# Patient Record
Sex: FEMALE | Race: WHITE | NOT HISPANIC OR LATINO | Employment: OTHER | URBAN - METROPOLITAN AREA
[De-identification: names, ages, dates, MRNs, and addresses within clinical notes are randomized per-mention and may not be internally consistent; named-entity substitution may affect disease eponyms.]

---

## 2022-02-16 PROCEDURE — 88305 TISSUE EXAM BY PATHOLOGIST: CPT | Mod: TC,ORL | Performed by: INTERNAL MEDICINE

## 2022-02-17 ENCOUNTER — LAB REQUISITION (OUTPATIENT)
Dept: LAB | Facility: CLINIC | Age: 72
End: 2022-02-17
Payer: COMMERCIAL

## 2022-02-17 DIAGNOSIS — K52.9 NONINFECTIVE GASTROENTERITIS AND COLITIS, UNSPECIFIED: ICD-10-CM

## 2022-02-17 DIAGNOSIS — K57.30 DIVERTICULOSIS OF LARGE INTESTINE WITHOUT PERFORATION OR ABSCESS WITHOUT BLEEDING: ICD-10-CM

## 2022-02-18 LAB
PATH REPORT.COMMENTS IMP SPEC: NORMAL
PATH REPORT.COMMENTS IMP SPEC: NORMAL
PATH REPORT.FINAL DX SPEC: NORMAL
PATH REPORT.GROSS SPEC: NORMAL
PATH REPORT.MICROSCOPIC SPEC OTHER STN: NORMAL
PATH REPORT.RELEVANT HX SPEC: NORMAL
PHOTO IMAGE: NORMAL

## 2022-02-18 PROCEDURE — 88305 TISSUE EXAM BY PATHOLOGIST: CPT | Mod: 26 | Performed by: PATHOLOGY

## 2023-03-04 ENCOUNTER — APPOINTMENT (OUTPATIENT)
Dept: GENERAL RADIOLOGY | Facility: CLINIC | Age: 73
End: 2023-03-04
Attending: EMERGENCY MEDICINE
Payer: COMMERCIAL

## 2023-03-04 ENCOUNTER — HOSPITAL ENCOUNTER (EMERGENCY)
Facility: CLINIC | Age: 73
Discharge: HOME | End: 2023-03-04
Payer: COMMERCIAL

## 2023-03-04 VITALS
SYSTOLIC BLOOD PRESSURE: 157 MMHG | RESPIRATION RATE: 18 BRPM | DIASTOLIC BLOOD PRESSURE: 82 MMHG | OXYGEN SATURATION: 100 % | TEMPERATURE: 98.4 F | HEART RATE: 76 BPM

## 2023-03-04 DIAGNOSIS — S42.001A CLOSED DISPLACED FRACTURE OF RIGHT CLAVICLE, UNSPECIFIED PART OF CLAVICLE, INITIAL ENCOUNTER: ICD-10-CM
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
## 2023-03-04 PROCEDURE — 250N000013 HC RX MED GY IP 250 OP 250 PS 637

## 2023-03-04 PROCEDURE — 250N000013 HC RX MED GY IP 250 OP 250 PS 637: Performed by: EMERGENCY MEDICINE

## 2023-03-04 PROCEDURE — 73000 X-RAY EXAM OF COLLAR BONE: CPT | Mod: RT

## 2023-03-04 PROCEDURE — 23500 CLTX CLAVICULAR FX W/O MNPJ: CPT | Mod: RT

## 2023-03-04 PROCEDURE — 73060 X-RAY EXAM OF HUMERUS: CPT | Mod: RT

## 2023-03-04 PROCEDURE — 99285 EMERGENCY DEPT VISIT HI MDM: CPT

## 2023-03-04 PROCEDURE — 73030 X-RAY EXAM OF SHOULDER: CPT | Mod: RT

## 2023-03-04 RX ORDER — OXYCODONE HYDROCHLORIDE 5 MG/1
5 TABLET ORAL EVERY 6 HOURS PRN
Qty: 12 TABLET | Refills: 0 | Status: SHIPPED | OUTPATIENT
Start: 2023-03-04

## 2023-03-04 RX ORDER — ACETAMINOPHEN 500 MG
1000 TABLET ORAL ONCE
Status: COMPLETED | OUTPATIENT
Start: 2023-03-04 | End: 2023-03-04

## 2023-03-04 RX ORDER — IBUPROFEN 600 MG/1
600 TABLET, FILM COATED ORAL ONCE
Status: COMPLETED | OUTPATIENT
Start: 2023-03-04 | End: 2023-03-04

## 2023-03-04 RX ORDER — OXYCODONE HYDROCHLORIDE 5 MG/1
5 TABLET ORAL ONCE
Status: COMPLETED | OUTPATIENT
Start: 2023-03-04 | End: 2023-03-04

## 2023-03-04 RX ADMIN — IBUPROFEN 600 MG: 600 TABLET, FILM COATED ORAL at 17:59

## 2023-03-04 RX ADMIN — ACETAMINOPHEN 1000 MG: 500 TABLET ORAL at 17:59

## 2023-03-04 RX ADMIN — OXYCODONE HYDROCHLORIDE 5 MG: 5 TABLET ORAL at 19:19

## 2023-03-04 ASSESSMENT — ACTIVITIES OF DAILY LIVING (ADL): ADLS_ACUITY_SCORE: 35

## 2023-03-04 ASSESSMENT — ENCOUNTER SYMPTOMS
VOMITING: 0
NAUSEA: 0
BACK PAIN: 0
ARTHRALGIAS: 1
HEADACHES: 0
NECK PAIN: 1

## 2023-03-04 NOTE — ED TRIAGE NOTES
Pt presents for evaluation of shoulder injurty following fall in a parking lot. Pt was trying to haresh down her runaway cart and stumbled and fell. She states she did hit her head but denies LOC, blood thinners, or sx of head injury. Bruising and swelling noted to right collar bone.      Triage Assessment     Row Name 03/04/23 3004       Triage Assessment (Adult)    Airway WDL WDL       Respiratory WDL    Respiratory WDL WDL       Skin Circulation/Temperature WDL    Skin Circulation/Temperature WDL WDL       Cardiac WDL    Cardiac WDL WDL       Peripheral/Neurovascular WDL    Peripheral Neurovascular WDL WDL       Cognitive/Neuro/Behavioral WDL    Cognitive/Neuro/Behavioral WDL WDL

## 2023-03-05 NOTE — ED PROVIDER NOTES
Emergency Department Attending Supervision Note  3/4/2023  7:04 PM      I evaluated this patient in conjunction with Lili Blake PA-C      Briefly, the patient presented with shoulder pain after a fall.  No headache no neck pain no loss of consciousness.      On my exam,       Gen: Resting comfortably   Eyes: Clear conjunctiva, no discharge  Ears: External ears normal without swelling or drainage  Mouth: Mucous membranes moist  CV: regular rate  Resp: speaking in full sentences without any resp distress  Skin: warm dry well perfused, tenderness over the right clavicle no significant tenting  Neuro: Alert, no gross motor or sensory deficits,   Psych: pleasant, affect appropriate        Results:  Right clavicle fracture    ED course:    My impression is   Patient presents ED after fall with a right clavicle fracture.  We will go ahead get her placed in a sling.  We will send home with a little bit of narcotic pain medicine as she has having a bit of pain.  She has some extra family here to help out.  Will encourage Ortho follow-up by calling them on Monday.  Patient feels comfortable with discharge plan.  No evidence of neck trauma or head trauma at this time.        Diagnosis    ICD-10-CM    1. Closed displaced fracture of right clavicle, unspecified part of clavicle, initial encounter  S42.001A       2. Fall, initial encounter  W19.XXXA             Lili Blake PA-C Tschetter, Paul Anthony, MD  03/04/23 1959

## 2023-03-05 NOTE — ED PROVIDER NOTES
History     Chief Complaint:  Fall       HPI   Jeri Candelaria is a 72 year old female with a history of chronic right shoulder pain who presents with right shoulder pain following a mechanical fall in a parking lot today. Patient did hit her head after hitting her shoulder first without loss of consciousness. No midline neck pain or issues ranging her neck. Denies headache, nausea, vomiting, vision change, dizziness or recent head injury. She also denies chest pain, back pain, and headaches.  Patient does not take blood thinners other than low dose Asprin. She as gotten tylenol and ibuprofen here.     Independent Historian:   None - Patient Only    Review of External Notes: None    ROS:  Review of Systems   Eyes: Negative for visual disturbance.   Cardiovascular: Negative for chest pain.   Gastrointestinal: Negative for nausea and vomiting.   Musculoskeletal: Positive for arthralgias and neck pain. Negative for back pain.   Neurological: Negative for syncope and headaches.   A comprehensive ROS was obtained and is negative aside from those called out in the HPI above.     Allergies:  No known drug allergies      Medications:    gabapentin  Synthroid  Cymbalta  Zocor      Past Medical History:    Chronic right shoulder pain  Screening for colon cancer  Acquired hypothyroidism   Pure hypercholesterolemia  Gastroesophageal reflux disease  Recurrent major depressive disorder, in full remission  Carpal tunnel   Prediabetes  Incisional hernia  Restless leg syndrome  Anxiety   Appendicitis    Past Surgical History:    Hernia repair   Appendectomy  Cholecystectomy   excision soft tissue mass  [place] tooth extraction    bunionectomy     Family History:    Father: heart disease   Mother: lung disease, Emphysema, osteoporosis, heart disease   Brother: Cancer Prostate,    Social History:  Patient presents with daughter     Physical Exam     Patient Vitals for the past 24 hrs:   BP Temp Temp src Pulse Resp SpO2   03/04/23  1755 (!) 164/97 98.4  F (36.9  C) Temporal 81 16 96 %        Physical Exam  General: Pleasant elderly female laying on gurney.  HENT:   Head: Atraumatic.  Mouth/Throat: Oropharynx clear and moist.  Eyes: Conjunctive and EOM normal. PERRLA.  Neck: Normal ROM. No rigidity. No midline c-spine tenderness. Mild tenderness over paraspinal musculature.  CV: Regular rate and rhythm. Radial pulse and capillary refill intact RUE.  Resp: Lungs clear to auscultation bilaterally. Normal respiratory effort.   GI: Abdomen soft, non distended and nontender. No rebound or guarding.  MSK: Patient guarded with ROM of RUE. Patient has tenderness over mid R clavicle with some mild tenting/swelling. No open fracture or wounds. Patient ranges at R elbow and wrist without difficulty.  Skin: Warm and dry. Sensation intact RUE.  Neuro: Awake, alert, oriented x 3. GCS 15. CNs II-XII intact.  Psych: Normal mood and affect.     Emergency Department Course       Imaging:  XR Shoulder Right G/E 3 Views   Final Result   IMPRESSION: There is a midclavicular fracture with one shaft width inferior displacement of the distal fragment and 1.2 cm of foreshortening. No additional fracture in the humerus. Acromioclavicular and glenohumeral joints remain congruent.         NOTE: ABNORMAL REPORT      THE DICTATION ABOVE DESCRIBES AN ABNORMALITY FOR WHICH FOLLOW-UP IS NEEDED.       Humerus XR, G/E 2 views, right   Final Result   IMPRESSION: There is a midclavicular fracture with one shaft width inferior displacement of the distal fragment and 1.2 cm of foreshortening. No additional fracture in the humerus. Acromioclavicular and glenohumeral joints remain congruent.         NOTE: ABNORMAL REPORT      THE DICTATION ABOVE DESCRIBES AN ABNORMALITY FOR WHICH FOLLOW-UP IS NEEDED.       Clavicle XR, right   Final Result   IMPRESSION: There is a midclavicular fracture with one shaft width inferior displacement of the distal fragment and 1.2 cm of foreshortening. No  additional fracture in the humerus. Acromioclavicular and glenohumeral joints remain congruent.         NOTE: ABNORMAL REPORT      THE DICTATION ABOVE DESCRIBES AN ABNORMALITY FOR WHICH FOLLOW-UP IS NEEDED.          Report per radiology        Emergency Department Course & Assessments:  Interventions:  Medications   oxyCODONE (ROXICODONE) tablet 5 mg (has no administration in time range)   acetaminophen (TYLENOL) tablet 1,000 mg (1,000 mg Oral $Given 3/4/23 1759)   ibuprofen (ADVIL/MOTRIN) tablet 600 mg (600 mg Oral $Given 3/4/23 1759)        Assessments:  1857 I obtained history and examined the patient as noted above.   1904 Staffed fracture with    1919    I explained findings to patient and daughter and discussed follow up plan.  1946  examined the patient.    Independent Interpretation (X-rays, CTs, rhythm strip):  I reviewed the patient's xrays and agree with Rad interpretation.       Social Determinants of Health affecting care:   None    Disposition:  The patient was discharged to home.     Impression & Plan    Medical Decision Making:  Jeri is a remarkably pleasant 72-year-old female who came into the emergency department for evaluation of a mechanical fall in a parking lot prior to arrival where she landed on her right side.  After hitting her right shoulder, she states she did hit her head.  She denies loss of consciousness, nausea, vomiting, dizziness, headache.  Head appeared atraumatic and she was GCS 15 with no focal neurologic deficits.  She is not on blood thinners and no recent head trauma.  I did not pursue advanced imaging.  Neck was cleared by Nexus criteria and she had no midline C-spine tenderness and range the neck without difficulty.  On exam, she had some mild skin tenting over the mid right clavicle and x-rays confirmed clavicular fracture.  She was given ibuprofen and Tylenol as well as ice.  She still complained of discomfort after this and I told her I could  supply a small prescription of oxycodone until she is able to follow-up with orthopedics.  She will call [facility] on Monday for follow-up.  In the meantime she will wear a sling and will ice the area off-and-on over the next few days.  Due to the fracture, I did staffed the patient with Dr. Cruz.  Of note, x-rays in triage also imaged her humerus and shoulder and there was no acute fracture or deformity seen on those images.  Patient was discharged home in the care of her daughter with close follow-up plan.      Diagnosis:    ICD-10-CM    1. Closed displaced fracture of right clavicle, unspecified part of clavicle, initial encounter  S42.001A       2. Fall, initial encounter  W19.XXXA            Discharge Medications:  New Prescriptions    OXYCODONE (ROXICODONE) 5 MG TABLET    Take 1 tablet (5 mg) by mouth every 6 hours as needed for pain          Srinivasa Ordoñez  3/4/2023   Lili Blake PA-C Dewing, Jennifer C, PA-C  03/1950

## 2023-03-05 NOTE — DISCHARGE INSTRUCTIONS
Take 650mg Tylenol + 600mg Ibuprofen for pain every 6 hours as needed. For severe pain, we have prescribed a few oxycodone tablets. Be sure to take miralax and a stool softener if you use the oxycodone because it can be constipating. Please ice the clavicle off and on (20 mins at a time, throughout the day). Wear a sling. Call [facility] Monday morning to schedule an appointment for recheck.

## (undated) RX ORDER — OXYCODONE HYDROCHLORIDE 5 MG/1
TABLET ORAL
Status: DISPENSED
Start: 2023-03-04